# Patient Record
Sex: MALE
[De-identification: names, ages, dates, MRNs, and addresses within clinical notes are randomized per-mention and may not be internally consistent; named-entity substitution may affect disease eponyms.]

---

## 2018-10-01 ENCOUNTER — HOSPITAL ENCOUNTER (OUTPATIENT)
Dept: HOSPITAL 97 - OR | Age: 78
Discharge: HOME | End: 2018-10-01
Attending: OPHTHALMOLOGY
Payer: COMMERCIAL

## 2018-10-01 DIAGNOSIS — Z88.1: ICD-10-CM

## 2018-10-01 DIAGNOSIS — I10: ICD-10-CM

## 2018-10-01 DIAGNOSIS — H25.12: Primary | ICD-10-CM

## 2018-10-01 PROCEDURE — 66984 XCAPSL CTRC RMVL W/O ECP: CPT

## 2018-10-01 PROCEDURE — 08RK3JZ REPLACEMENT OF LEFT LENS WITH SYNTHETIC SUBSTITUTE, PERCUTANEOUS APPROACH: ICD-10-PCS

## 2018-10-01 RX ADMIN — PHENYLEPHRINE HYDROCHLORIDE ONE DROPS: 100 SOLUTION/ DROPS OPHTHALMIC at 07:29

## 2018-10-01 RX ADMIN — CYCLOPENTOLATE HYDROCHLORIDE ONE DROPS: 10 SOLUTION OPHTHALMIC at 07:29

## 2018-10-01 RX ADMIN — CYCLOPENTOLATE HYDROCHLORIDE ONE DROPS: 10 SOLUTION OPHTHALMIC at 07:24

## 2018-10-01 RX ADMIN — PHENYLEPHRINE HYDROCHLORIDE ONE DROPS: 100 SOLUTION/ DROPS OPHTHALMIC at 07:24

## 2018-10-01 RX ADMIN — MOXIFLOXACIN HYDROCHLORIDE ONE DROPS: 5 SOLUTION/ DROPS OPHTHALMIC at 09:02

## 2018-10-01 RX ADMIN — PHENYLEPHRINE HYDROCHLORIDE ONE DROPS: 100 SOLUTION/ DROPS OPHTHALMIC at 07:19

## 2018-10-01 RX ADMIN — CYCLOPENTOLATE HYDROCHLORIDE ONE DROPS: 10 SOLUTION OPHTHALMIC at 07:19

## 2018-10-01 RX ADMIN — MOXIFLOXACIN HYDROCHLORIDE ONE DROPS: 5 SOLUTION/ DROPS OPHTHALMIC at 08:40

## 2018-10-01 NOTE — OP
Date of Procedure:  10/01/2018



Surgeon:  Monse Noland MD



Anesthesiologist:  Calvin Kurtz CRNA



Preoperative Diagnosis:  Nuclear sclerotic cataract, left eye.



Operation Performed:  Phacoemulsification with intraocular lens implant, left eye.



Anesthesia:  Per cataract surgery.



Complications:  None.



Description Of Procedure:  In day surgery, the patient was prepped with Betadine and draped.  A conju
nctival incision was made in the inferior nasal quadrant with Susie scissors.  A sub-Tenon block c
onsisting of a 1:1 mixture of 2% Xylocaine and 0.25% bupivacaine was placed through the conjunctival 
incision with a blunt cannula.  A Honan balloon was placed over the eye and the patient was transferr
ed to the operating room. 



In the operating room the patient was prepped and draped in the usual sterile fashion for ophthalmic 
surgery.  A lid speculum was placed in the left eye.  Two paracentesis sites were made superiorly and
 inferiorly in the limbal cornea.  Viscoat was placed in the anterior chamber and a crescent blade wa
s used to make a corneal groove and tunnel, and a keratome was used to enter the anterior chamber.  P
rovisc was placed in the anterior chamber and a 360 degree capsulotomy was performed with a cystitome
.  The lens was hydrodissected with BSS and rotated freely.  The lens was removed with a stop and cho
p technique.  11.15 phaco CDE was used to remove the lens.  Residual cortex was removed with the irri
gation and aspiration.  Provisc was placed in the capsular bag.  A ZCB00 +22.0 lens was placed in the
 capsular bag without complications.  Irrigation and aspiration were used to remove residual viscoela
stic.  The paracentesis sites were hydrated with BSS.  The wound and paracentesis sites were inspecte
d and found to be watertight.  Vigamox 0.07 cc was placed intracamerally at the end of the procedure.
  The eye was irrigated with balanced salt solution.  The eye was patched with a soft cotton patch an
d Atwood metal shield. 



The patient was returned to day surgery in good condition.



Comments:  



Discharge Instructions:  Mr. Watson is discharged to home in good condition.  He is to follow up with
 Dr. Noland in the morning.





STEPHEN/STEPHEN

DD:  10/01/2018 09:11:45Voice ID:  [id number]

DT:  10/01/2018 20:24:44Report ID:  [id number]

## 2018-10-01 NOTE — P.BOP
Preoperative diagnosis: Nuclear sclerotic and cortical cataract OS


Postoperative diagnosis: Same


Primary procedure: Phacoemulsification with IOL OS


Estimated blood loss: None


Anesthesia: Local (Subtenon's infusion with anesthesia for cataract surgery)


Complications: None


Implants: ZCB00 +22.0


Transferred to: Other (Day surgery)


Condition: Good

## 2018-11-26 ENCOUNTER — HOSPITAL ENCOUNTER (OUTPATIENT)
Dept: HOSPITAL 97 - OR | Age: 78
Discharge: HOME | End: 2018-11-26
Attending: OPHTHALMOLOGY
Payer: COMMERCIAL

## 2018-11-26 DIAGNOSIS — H35.3130: ICD-10-CM

## 2018-11-26 DIAGNOSIS — Z88.1: ICD-10-CM

## 2018-11-26 DIAGNOSIS — H25.11: Primary | ICD-10-CM

## 2018-11-26 DIAGNOSIS — I10: ICD-10-CM

## 2018-11-26 DIAGNOSIS — H04.123: ICD-10-CM

## 2018-11-26 DIAGNOSIS — Z87.891: ICD-10-CM

## 2018-11-26 DIAGNOSIS — H25.011: ICD-10-CM

## 2018-11-26 PROCEDURE — 66984 XCAPSL CTRC RMVL W/O ECP: CPT

## 2018-11-26 PROCEDURE — 08RJ3JZ REPLACEMENT OF RIGHT LENS WITH SYNTHETIC SUBSTITUTE, PERCUTANEOUS APPROACH: ICD-10-PCS

## 2018-11-26 RX ADMIN — TETRACAINE HYDROCHLORIDE ONE DROPS: 5 SOLUTION OPHTHALMIC at 09:51

## 2018-11-26 RX ADMIN — LIDOCAINE HYDROCHLORIDE ONE ML: 20 INJECTION, SOLUTION EPIDURAL; INFILTRATION; INTRACAUDAL; PERINEURAL at 09:52

## 2018-11-26 RX ADMIN — BUPIVACAINE HYDROCHLORIDE ONE ML: 2.5 INJECTION, SOLUTION EPIDURAL; INFILTRATION; INTRACAUDAL at 09:52

## 2018-11-26 RX ADMIN — TETRACAINE HYDROCHLORIDE ONE DROPS: 5 SOLUTION OPHTHALMIC at 08:51

## 2018-11-26 RX ADMIN — LIDOCAINE HYDROCHLORIDE ONE ML: 20 INJECTION, SOLUTION EPIDURAL; INFILTRATION; INTRACAUDAL; PERINEURAL at 08:51

## 2018-11-26 RX ADMIN — BUPIVACAINE HYDROCHLORIDE ONE ML: 2.5 INJECTION, SOLUTION EPIDURAL; INFILTRATION; INTRACAUDAL at 08:52

## 2018-11-26 NOTE — OP
Date of Procedure:  11/26/2018



Surgeon:  Monse Noland MD



Anesthesiologist:  1. Angelia Norman CRNA. 

2. Tian Humphrey M.D.



Preoperative Diagnosis:  Nuclear sclerotic cataract and cortical cataract, right eye.



Operation Performed:  Phacoemulsification with intraocular lens implant, right eye.



Anesthesia:  Per cataract surgery.



Complications:  None.



Description Of Procedure:  In day surgery, the patient was prepped with Betadine and draped.  A conju
nctival incision was made in the inferior nasal quadrant with Susie scissors.  A sub-Tenon block c
onsisting of a 1:1 mixture of 2% Xylocaine and 0.25% bupivacaine was placed through the conjunctival 
incision with a blunt cannula.  A Honan balloon was placed over the eye and the patient was transferr
ed to the operating room. 



In the operating room the patient was prepped and draped in the usual sterile fashion for ophthalmic 
surgery.  A lid speculum was placed in the right eye.  Two paracentesis sites were made superiorly an
d inferiorly in the limbal cornea.  Viscoat was placed in the anterior chamber and a crescent blade w
as used to make a corneal groove and tunnel, and a keratome was used to enter the anterior chamber.  
Provisc was placed in the anterior chamber and a 360 degree capsulotomy was performed with a cystitom
e.  The lens was hydrodissected with BSS and rotated freely.  The lens was removed with a stop and ch
op technique.  A 3.64 phaco CDE was used to remove the lens.  Residual cortex was removed with the ir
rigation and aspiration.  Provisc was placed in the capsular bag.  A ZCB00+22.5 lens was placed in th
e capsular bag without complications.  Irrigation and aspiration were used to remove residual viscoel
astic.  The paracentesis sites were hydrated with BSS.  The wound and paracentesis sites were inspect
ed and found to be watertight.  Vigamox 0.07 cc was placed intracamerally at the end of the procedure
.  The eye was irrigated with balanced salt solution.  The eye was patched with a soft cotton patch a
nd Atwood metal shield. 



The patient was returned to day surgery in good condition.



Comments:  The lens was hydrodelineated rather than hydrodissected.



Discharge Instructions:  Mr. Watson is discharged to home in good condition and is to follow up with 
Dr. Noland in the morning.





STEPHEN/STEPHEN

DD:  11/26/2018 10:37:08Voice ID:  [id number]

DT:  11/26/2018 21:20:04Report ID:  [id number]

## 2018-11-26 NOTE — P.BOP
Preoperative diagnosis: Nuclear sclerotic and cortical cataract OD


Postoperative diagnosis: Same


Primary procedure: Phacoemulsification with IOL OD


Estimated blood loss: None


Anesthesia: Local (Subtenon's infusion with anesthesia for cataract surgery)


Complications: None


Implants: ZCB00 +22.5


Transferred to: Other (Day surgery)


Condition: Good

## 2020-03-03 ENCOUNTER — HOSPITAL ENCOUNTER (INPATIENT)
Dept: HOSPITAL 97 - 2ND | Age: 80
LOS: 2 days | Discharge: HOME | DRG: 872 | End: 2020-03-05
Attending: INTERNAL MEDICINE | Admitting: INTERNAL MEDICINE
Payer: COMMERCIAL

## 2020-03-03 VITALS — BODY MASS INDEX: 24.8 KG/M2

## 2020-03-03 DIAGNOSIS — I48.91: ICD-10-CM

## 2020-03-03 DIAGNOSIS — F41.9: ICD-10-CM

## 2020-03-03 DIAGNOSIS — B96.20: ICD-10-CM

## 2020-03-03 DIAGNOSIS — B96.5: ICD-10-CM

## 2020-03-03 DIAGNOSIS — E86.0: ICD-10-CM

## 2020-03-03 DIAGNOSIS — A41.9: Primary | ICD-10-CM

## 2020-03-03 DIAGNOSIS — N39.0: ICD-10-CM

## 2020-03-03 LAB
ALBUMIN SERPL BCP-MCNC: 3 G/DL (ref 3.4–5)
ALP SERPL-CCNC: 68 U/L (ref 45–117)
ALT SERPL W P-5'-P-CCNC: 19 U/L (ref 12–78)
AST SERPL W P-5'-P-CCNC: 21 U/L (ref 15–37)
BLD SMEAR INTERP: (no result)
BUN BLD-MCNC: 30 MG/DL (ref 7–18)
GLUCOSE SERPLBLD-MCNC: 139 MG/DL (ref 74–106)
HCT VFR BLD CALC: 38.7 % (ref 39.6–49)
INR BLD: 1.52
LYMPHOCYTES # SPEC AUTO: 1 K/UL (ref 0.7–4.9)
MAGNESIUM SERPL-MCNC: 2.1 MG/DL (ref 1.8–2.4)
MORPHOLOGY BLD-IMP: (no result)
PMV BLD: 9.9 FL (ref 7.6–11.3)
POTASSIUM SERPL-SCNC: 4 MMOL/L (ref 3.5–5.1)
RBC # BLD: 4.38 M/UL (ref 4.33–5.43)
TSH SERPL DL<=0.05 MIU/L-ACNC: 5.39 UIU/ML (ref 0.36–3.74)
UA COMPLETE W REFLEX CULTURE PNL UR: (no result)

## 2020-03-03 PROCEDURE — 80048 BASIC METABOLIC PNL TOTAL CA: CPT

## 2020-03-03 PROCEDURE — 83735 ASSAY OF MAGNESIUM: CPT

## 2020-03-03 PROCEDURE — 82607 VITAMIN B-12: CPT

## 2020-03-03 PROCEDURE — 85730 THROMBOPLASTIN TIME PARTIAL: CPT

## 2020-03-03 PROCEDURE — 85610 PROTHROMBIN TIME: CPT

## 2020-03-03 PROCEDURE — 84443 ASSAY THYROID STIM HORMONE: CPT

## 2020-03-03 PROCEDURE — 84145 PROCALCITONIN (PCT): CPT

## 2020-03-03 PROCEDURE — 84100 ASSAY OF PHOSPHORUS: CPT

## 2020-03-03 PROCEDURE — 80076 HEPATIC FUNCTION PANEL: CPT

## 2020-03-03 PROCEDURE — 93005 ELECTROCARDIOGRAM TRACING: CPT

## 2020-03-03 PROCEDURE — 87040 BLOOD CULTURE FOR BACTERIA: CPT

## 2020-03-03 PROCEDURE — 84439 ASSAY OF FREE THYROXINE: CPT

## 2020-03-03 PROCEDURE — 81001 URINALYSIS AUTO W/SCOPE: CPT

## 2020-03-03 PROCEDURE — 82652 VIT D 1 25-DIHYDROXY: CPT

## 2020-03-03 PROCEDURE — 85025 COMPLETE CBC W/AUTO DIFF WBC: CPT

## 2020-03-03 PROCEDURE — 83605 ASSAY OF LACTIC ACID: CPT

## 2020-03-03 PROCEDURE — 87086 URINE CULTURE/COLONY COUNT: CPT

## 2020-03-03 PROCEDURE — 71046 X-RAY EXAM CHEST 2 VIEWS: CPT

## 2020-03-03 PROCEDURE — 82550 ASSAY OF CK (CPK): CPT

## 2020-03-03 PROCEDURE — 84484 ASSAY OF TROPONIN QUANT: CPT

## 2020-03-03 PROCEDURE — 87088 URINE BACTERIA CULTURE: CPT

## 2020-03-03 PROCEDURE — 36415 COLL VENOUS BLD VENIPUNCTURE: CPT

## 2020-03-03 RX ADMIN — DIGOXIN SCH MG: 0.25 INJECTION INTRAMUSCULAR; INTRAVENOUS at 17:54

## 2020-03-03 RX ADMIN — CEFTRIAXONE SCH MLS: 1 INJECTION, SOLUTION INTRAVENOUS at 16:42

## 2020-03-03 RX ADMIN — SODIUM CHLORIDE SCH MLS: 0.45 INJECTION, SOLUTION INTRAVENOUS at 16:42

## 2020-03-03 NOTE — RAD REPORT
EXAM DESCRIPTION:  Lamin Anderson And Magda (2 Views)3/3/2020 4:18 pm

 

CLINICAL HISTORY:  Cough

 

COMPARISON:  2019

 

FINDINGS:  Lungs are mildly to moderately hyperaerated

 

 The lungs appear clear of acute infiltrate. The heart is normal size

 

IMPRESSION:   No acute abnormalities displayed

## 2020-03-04 LAB
BUN BLD-MCNC: 29 MG/DL (ref 7–18)
GLUCOSE SERPLBLD-MCNC: 107 MG/DL (ref 74–106)
HCT VFR BLD CALC: 36.2 % (ref 39.6–49)
LYMPHOCYTES # SPEC AUTO: 1.1 K/UL (ref 0.7–4.9)
MAGNESIUM SERPL-MCNC: 2.1 MG/DL (ref 1.8–2.4)
PMV BLD: 10.2 FL (ref 7.6–11.3)
POTASSIUM SERPL-SCNC: 4.1 MMOL/L (ref 3.5–5.1)
RBC # BLD: 4.1 M/UL (ref 4.33–5.43)

## 2020-03-04 RX ADMIN — APIXABAN SCH MG: 5 TABLET, FILM COATED ORAL at 08:06

## 2020-03-04 RX ADMIN — DIGOXIN SCH MG: 0.25 INJECTION INTRAMUSCULAR; INTRAVENOUS at 00:02

## 2020-03-04 RX ADMIN — SODIUM CHLORIDE SCH MLS: 0.45 INJECTION, SOLUTION INTRAVENOUS at 17:53

## 2020-03-04 RX ADMIN — SOTALOL HYDROCHLORIDE SCH MG: 80 TABLET ORAL at 17:53

## 2020-03-04 RX ADMIN — ACETAMINOPHEN PRN MG: 325 TABLET ORAL at 16:28

## 2020-03-04 RX ADMIN — SODIUM CHLORIDE SCH MLS: 0.45 INJECTION, SOLUTION INTRAVENOUS at 05:40

## 2020-03-04 RX ADMIN — DIPHENHYDRAMINE HYDROCHLORIDE PRN MG: 25 CAPSULE ORAL at 00:12

## 2020-03-04 RX ADMIN — APIXABAN SCH MG: 5 TABLET, FILM COATED ORAL at 20:20

## 2020-03-04 RX ADMIN — DIPHENHYDRAMINE HYDROCHLORIDE PRN MG: 25 CAPSULE ORAL at 20:20

## 2020-03-04 RX ADMIN — CEFTRIAXONE SCH MLS: 1 INJECTION, SOLUTION INTRAVENOUS at 08:06

## 2020-03-04 NOTE — EKG
Test Date:    2020-03-03               Test Time:    21:35:30

Technician:   RT                                     

                                                     

MEASUREMENT RESULTS:                                       

Intervals:                                           

Rate:         144                                    

OR:                                                  

QRSD:         82                                     

QT:           290                                    

QTc:          449                                    

Axis:                                                

P:                                                   

OR:                                                  

QRS:          67                                     

T:            82                                     

                                                     

INTERPRETIVE STATEMENTS:                                       

                                                     

Atrial flutter with variable AV block

Septal infarct, age undetermined

Abnormal ECG

Compared to ECG 09/04/2015 11:45:20

Myocardial infarct finding now present

Sinus rhythm no longer present

First degree AV block no longer present



Electronically Signed On 03-04-20 09:04:10 CST by Gutierrez De La Torre

## 2020-03-04 NOTE — EKG
Test Date:    2020-03-04               Test Time:    00:28:19

Technician:   RT                                     

                                                     

MEASUREMENT RESULTS:                                       

Intervals:                                           

Rate:         92                                     

UT:           252                                    

QRSD:         82                                     

QT:           328                                    

QTc:          405                                    

Axis:                                                

P:            63                                     

UT:           252                                    

QRS:          60                                     

T:            69                                     

                                                     

INTERPRETIVE STATEMENTS:                                       

                                                     

Sinus rhythm with 1st degree AV block

Septal infarct, age undetermined

Abnormal ECG

Compared to ECG 03/03/2020 21:35:30

First degree AV block now present

Atrial flutter no longer present

Myocardial infarct finding still present



Electronically Signed On 03-04-20 09:04:04 CST by Gutierrez De La Torre

## 2020-03-04 NOTE — P.PN
Subjective


Date of Service: 03/04/20


Chief Complaint: WEAK, UTI, NEW A FIB.


Subjective: Improving





MR. MARINELLI IS A LOT BETTER. HE IS STRONGER. YESTERDAY HAD  NEW ONSET A FIB.  I 

GAVE HIM TWO DOSE OF DIGOXIN, ELIQUIS PO BID AND HE CONVERTED TO SINUS IN A FEW 

HOURS.  THIS AM HE IS NOW STARTED ON BETAPACE BY DR MELO. HIS WBC COUNT IS 

LOWER AND HIS BP HAS IMPROVED NOW FROM LOW DOWN TO 90 SYSTOLIC YESTERDAY.





Review of Systems


10-point ROS is otherwise unremarkable


General: Weakness, Malaise


Cardiovascular: As per HPI





Physical Examination





- Vital Signs


Temperature: 98.4 F


Blood Pressure: 155/82


Pulse: 74


Respirations: 20


Pulse Ox (%): 96





- Physical Exam


General: Alert, Mild distress (ANXIOUS AT BASELINE.)


HEENT: Atraumatic, PERRLA, EOMI


Neck: Supple, JVD not distended


Respiratory: Clear to auscultation bilaterally, Normal air movement


Cardiovascular: Regular rate/rhythm, Normal S1 S2


Gastrointestinal: Normal bowel sounds, No tenderness


Musculoskeletal: No tenderness


Integumentary: No rashes


Neurological: Normal speech, Normal tone, Normal affect


Lymphatics: No axilla or inguinal lymphadenopathy





- Studies


Laboratory Data (last 24 hrs)





03/04/20 05:10: Sodium 135 L, Potassium 4.1, BUN 29 H, Creatinine 1.14, Glucose 

107 H, Magnesium 2.1


03/04/20 05:10: WBC 12.4 H D, Hgb 11.9 L, Hct 36.2 L, Plt Count 167





Medications List Reviewed: Yes





Assessment And Plan





- Current Problems (Diagnosis)


(1) UTI (urinary tract infection)


Current Visit: Yes   Status: Acute   


Plan: 


IV ROCEPHIN IS WORKING. 


CULTURES PENDING. 


Qualifiers: 


   Urinary tract infection type: site unspecified   Hematuria presence: without 

hematuria   Qualified Code(s): N39.0 - Urinary tract infection, site not 

specified   





(2) Sepsis


Current Visit: Yes   Status: Acute   


Plan: 


AS ABOVE.


IV FLUIDS


BP  SYSTOLIC NOW.


Qualifiers: 


   Sepsis type: sepsis due to unspecified organism   Sepsis acute organ 

dysfunction status: without acute organ dysfunction   Qualified Code(s): A41.9 

- Sepsis, unspecified organism   





(3) New onset a-fib


Current Visit: Yes   Status: Acute   


Plan: 


HE IS BACK IN SINUS RHYTHM WITH DIGOXIN IV.


NOW ON BETAPACE. 


WE STOPPED AMLODIPINE AND LOSARTAN.








(4) Anxiety disorder


Current Visit: Yes   Status: Acute   


Plan: 


CONTINUE LEXAPRO.


Qualifiers: 


   Anxiety disorder type: generalized anxiety disorder   Qualified Code(s): 

F41.1 - Generalized anxiety disorder   





(5) Dehydration


Current Visit: Yes   Status: Acute   


Plan: 


BP HAS IMPROVED WITH IV FLUIDS.

## 2020-03-04 NOTE — CON
Mr. Watson is 79.



Reason For Cardiology Consult:  Atrial fibrillation.



History Of Present Illness:  The day before yesterday, the patient got dizzy, fell to the side.  No i
njuries, but he was unable to stand up for an hour without help.  His wife eventually helped him up a
nd managed to get an appointment with Dr. Hermosillo yesterday, also a urologist, I suspected urinary trac
t infection and hypotension, but they found he was in atrial fibrillation.  In the ER, he was treated
 with some beta blockers I believe.  In any event, he is in normal rhythm now.  He did not receive a 
cardioversion.  Mr. Watson has never had coronary heart disease.  He has had urinary tract infections
, some TIAs or mini strokes, not sure exactly which one.  He has never had atrial fibrillation docume
nted before yesterday.



Medications:  His home medications have been amlodipine, finasteride, losartan, and a multivitamin.



Physical Examination:

Vital Signs:  5 feet 10, 172 pounds.  Blood pressure 142/76, heart rate 98 and 95, temperature 98.4. 


HEENT:  Normal. 

Lungs:  Clear. 

Heart:  Within normal limits, although he is mildly tachycardic. 

Abdomen:  Soft. 

Extremities:  Normal.  



The patient has been placed on apixaban.  I think that is the appropriate thing to do and regarding t
he dose, the dose is correct at 5 mg twice a day.  I think we should consider stopping amlodipine and
 stop the losartan and try him on Betapace to both keep him in normal rhythm larger percent of the ti
me and control his blood pressure using the beta-blocker effect of Betapace.  If that does not work, 
we will consider other antiarrhythmic drugs or more advanced therapies such as 

ablations, but I suspect treating him with Betapace and apixaban will be very effective.





KAROLINA/STEPHEN

DD:  03/04/2020 08:32:41Voice ID:  [id number]

DT:  03/04/2020 09:34:11Report ID:  [id number]

## 2020-03-05 VITALS — TEMPERATURE: 97.4 F | DIASTOLIC BLOOD PRESSURE: 78 MMHG | SYSTOLIC BLOOD PRESSURE: 149 MMHG

## 2020-03-05 VITALS — OXYGEN SATURATION: 92 %

## 2020-03-05 LAB
BUN BLD-MCNC: 18 MG/DL (ref 7–18)
GLUCOSE SERPLBLD-MCNC: 102 MG/DL (ref 74–106)
HCT VFR BLD CALC: 34.9 % (ref 39.6–49)
LYMPHOCYTES # SPEC AUTO: 1 K/UL (ref 0.7–4.9)
MAGNESIUM SERPL-MCNC: 2 MG/DL (ref 1.8–2.4)
PMV BLD: 10 FL (ref 7.6–11.3)
POTASSIUM SERPL-SCNC: 4.1 MMOL/L (ref 3.5–5.1)
RBC # BLD: 3.94 M/UL (ref 4.33–5.43)

## 2020-03-05 RX ADMIN — CEFTRIAXONE SCH MLS: 1 INJECTION, SOLUTION INTRAVENOUS at 07:32

## 2020-03-05 RX ADMIN — APIXABAN SCH MG: 5 TABLET, FILM COATED ORAL at 07:33

## 2020-03-05 RX ADMIN — SODIUM CHLORIDE SCH MLS: 0.45 INJECTION, SOLUTION INTRAVENOUS at 05:37

## 2020-03-05 RX ADMIN — LOSARTAN POTASSIUM SCH MG: 50 TABLET, FILM COATED ORAL at 07:47

## 2020-03-05 RX ADMIN — ACETAMINOPHEN PRN MG: 325 TABLET ORAL at 07:15

## 2020-03-05 RX ADMIN — LOSARTAN POTASSIUM SCH: 50 TABLET, FILM COATED ORAL at 07:45

## 2020-03-05 RX ADMIN — SOTALOL HYDROCHLORIDE SCH MG: 80 TABLET ORAL at 05:28

## 2020-03-05 NOTE — EKG
Test Date:    2020-03-05               Test Time:    08:01:39

Technician:   DARIAN                                     

                                                     

MEASUREMENT RESULTS:                                       

Intervals:                                           

Rate:         70                                     

MI:           248                                    

QRSD:         94                                     

QT:           376                                    

QTc:          406                                    

Axis:                                                

P:            74                                     

MI:           248                                    

QRS:          37                                     

T:            74                                     

                                                     

INTERPRETIVE STATEMENTS:                                       

                                                     

Sinus rhythm with 1st degree AV block

Otherwise normal ECG

Compared to ECG 03/04/2020 00:28:19

Myocardial infarct finding no longer present



Electronically Signed On 03-05-20 11:35:56 CST by Cruz Addison

## 2020-03-05 NOTE — P.DS
Admission Date: 03/04/20


Discharge Date: 03/05/20


Disposition: ROUTINE DISCHARGE


Discharge Condition: FAIR


Reason for Admission: WEAK, UTI, NEW A FIB.





- Problems


(1) UTI (urinary tract infection)


Status: Acute   


Qualifiers: 


   Urinary tract infection type: site unspecified   Hematuria presence: without 

hematuria   Qualified Code(s): N39.0 - Urinary tract infection, site not 

specified   





(2) Sepsis


Status: Acute   


Qualifiers: 


   Sepsis type: sepsis due to unspecified organism   Sepsis acute organ 

dysfunction status: without acute organ dysfunction   Qualified Code(s): A41.9 

- Sepsis, unspecified organism   





(3) New onset a-fib


Status: Acute   





(4) Anxiety disorder


Status: Acute   


Qualifiers: 


   Anxiety disorder type: generalized anxiety disorder   Qualified Code(s): 

F41.1 - Generalized anxiety disorder   





(5) Dehydration


Status: Acute   


Hospital Course: 





ALTHEA HAD FALLEN AT HOME.  I ADMITED HIM FOR HYPOTENSION, DEHYDRATION, UTI AND 

ALSO HE HAD NEW ONSET A FIB.  I REVERSED WITH IV DIGOXIN FOR TWO TIMES.  HE WAS 

PLACED ON BETAPACE AND ELIQUIS.  HIS WBC IMPROVED ON ROCEPHIN IV SO PLACED HIM 

ON CEFTIN PO.  LATER I HAD PHONE CALL FROM DR. NEWBY THAT FROM HIS OFFICE DAY 

EARLIER HE GREW PSEUDOMONA IN ADDTION TO E COLI. THIS WAS SENSITIVE TO 

LEVAQUIN. SO I WILL CONTINU BOTH CEFTIN AND LEVAQUIN FOR 7 DAYS AT LEAST.  HE 

WILL COME TO OFFICE IN AM. 


Vital Signs/Physical Exam: 














Temp Pulse Resp BP Pulse Ox


 


 97.4 F   67   18   149/78 H  95 


 


 03/05/20 11:12  03/05/20 11:12  03/05/20 11:12  03/05/20 11:12  03/05/20 11:12








Laboratory Data at Discharge: 














WBC  7.6 K/uL (4.3-10.9)  D 03/05/20  03:54    


 


Hgb  11.7 g/dL (13.6-17.9)  L  03/05/20  03:54    


 


Hct  34.9 % (39.6-49.0)  L  03/05/20  03:54    


 


Plt Count  174 K/uL (152-406)   03/05/20  03:54    


 


PT  17.7 SECONDS (9.5-12.5)  H  03/03/20  16:00    


 


INR  1.52   03/03/20  16:00    


 


APTT  23.7 SECONDS (24.3-36.9)  L  03/03/20  16:00    


 


Sodium  134 mmol/L (136-145)  L  03/05/20  03:54    


 


Potassium  4.1 mmol/L (3.5-5.1)   03/05/20  03:54    


 


BUN  18 mg/dL (7-18)   03/05/20  03:54    


 


Creatinine  0.81 mg/dL (0.55-1.3)   03/05/20  03:54    


 


Glucose  102 mg/dL ()   03/05/20  03:54    


 


Phosphorus  2.3 mg/dL (2.5-4.9)  L  03/03/20  16:00    


 


Magnesium  2.0 mg/dL (1.8-2.4)   03/05/20  03:54    


 


Total Bilirubin  0.9 mg/dL (0.2-1.0)   03/03/20  16:00    


 


AST  21 U/L (15-37)   03/03/20  16:00    


 


ALT  19 U/L (12-78)   03/03/20  16:00    


 


Alkaline Phosphatase  68 U/L ()   03/03/20  16:00    


 


Troponin I  0.12 ng/mL (0.0-0.045)  H  03/03/20  16:00    








Home Medications: 








Amlodipine Besylate [Norvasc] 2.5 mg PO DAILY AFTER SUPPER 09/26/18 


Finasteride [Proscar*] 5 mg PO DAILY AFTER SUPPER 09/26/18 


Losartan Potassium [Cozaar*] 75 mg PO [id number] 09/26/18 


Vit C/E/Zn/Coppr/Lutein/Zeaxan [Preservision Areds 2 Softgel] 1 each PO BID 09/ 26/18 


Apixaban [Eliquis] 5 mg PO BID #60 tablet 03/05/20 


Cefuroxime [Ceftin] 250 mg PO BID #24 tab 03/05/20 


Sotalol HCl [Betapace*] 40 mg PO BID 6AM 6PM #60 tab 03/05/20 





New Medications: 


Apixaban [Eliquis] 5 mg PO BID #60 tablet


Cefuroxime [Ceftin] 250 mg PO BID #24 tab


Sotalol HCl [Betapace*] 40 mg PO BID 6AM 6PM #60 tab


Followup: 


Cruz Addison MD [ACTIVE - CAN ADMIT] -

## 2020-03-05 NOTE — PN
Date of Progress Note:  03/05/2020



Mr. Watson was seen today on 03/05/2020 after he came in with atrial fibrillation.  Dr. De La Torre had pu
t him on sotalol and Eliquis.  He is in sinus rhythm.  No cardiac complaints today.  His vital signs 
are stable.  His chest is clear.  He has no edema.  The case was discussed with Dr. Hermosillo.  The patie
nt can go home today on sotalol and Eliquis.  Continue the rest of his medication.  I will see him in
 the office in the next week or 2.  He can go home today.





NB/STEPHEN

DD:  03/05/2020 14:39:25Voice ID:  [id number]

DT:  03/05/2020 18:17:56Report ID:  [id number]

## 2020-03-10 LAB
1,25(OH)2D SERPL-MCNC: 31 PG/ML (ref 18–72)
1,25(OH)2D2 SERPL-MCNC: <8 PG/ML